# Patient Record
Sex: FEMALE | Race: WHITE | URBAN - METROPOLITAN AREA
[De-identification: names, ages, dates, MRNs, and addresses within clinical notes are randomized per-mention and may not be internally consistent; named-entity substitution may affect disease eponyms.]

---

## 2021-11-01 ENCOUNTER — APPOINTMENT (OUTPATIENT)
Dept: URBAN - METROPOLITAN AREA SURGERY 9 | Age: 41
Setting detail: DERMATOLOGY
End: 2021-11-01

## 2021-11-01 PROBLEM — C44.41 BASAL CELL CARCINOMA OF SKIN OF SCALP AND NECK: Status: ACTIVE | Noted: 2021-11-01

## 2021-11-01 PROCEDURE — OTHER MOHS SURGERY: OTHER

## 2021-11-01 PROCEDURE — 17312 MOHS ADDL STAGE: CPT

## 2021-11-01 PROCEDURE — OTHER RETURN TO REFERRING PROVIDER: OTHER

## 2021-11-01 PROCEDURE — OTHER CONSULTATION FOR MOHS SURGERY: OTHER

## 2021-11-01 PROCEDURE — 17311 MOHS 1 STAGE H/N/HF/G: CPT

## 2021-11-01 NOTE — PROCEDURE: CONSULTATION FOR MOHS SURGERY
Detail Level: Detailed
Body Location Override (Optional - Billing Will Still Be Based On Selected Body Map Location If Applicable): right frontal scalp
X Size Of Lesion In Cm (Optional): 0
Anatomic Location From Referring Provider: left medial frontal scalp
Name Of The Referring Provider For Procedure: patricia
Incorporate Mauc In Note: Yes

## 2021-11-01 NOTE — HPI: MOHS SURGERY CONSULTATION
Has The Cancer Been Biopsied Before?: has been previously biopsied
Body Location Override (Optional): Right frontal scalp
Who Is Your Referring Provider?: Wero
When Was Your Biopsy?: 9/27/21